# Patient Record
Sex: FEMALE | Race: WHITE | NOT HISPANIC OR LATINO | Employment: FULL TIME | URBAN - METROPOLITAN AREA
[De-identification: names, ages, dates, MRNs, and addresses within clinical notes are randomized per-mention and may not be internally consistent; named-entity substitution may affect disease eponyms.]

---

## 2022-05-19 ENCOUNTER — OFFICE VISIT (OUTPATIENT)
Dept: URGENT CARE | Facility: URGENT CARE | Age: 37
End: 2022-05-19
Payer: COMMERCIAL

## 2022-05-19 VITALS
RESPIRATION RATE: 20 BRPM | TEMPERATURE: 98.3 F | SYSTOLIC BLOOD PRESSURE: 120 MMHG | DIASTOLIC BLOOD PRESSURE: 82 MMHG | OXYGEN SATURATION: 100 % | HEART RATE: 90 BPM

## 2022-05-19 DIAGNOSIS — R07.89 LEFT CHEST PRESSURE: ICD-10-CM

## 2022-05-19 DIAGNOSIS — I10 PRIMARY HYPERTENSION: Primary | ICD-10-CM

## 2022-05-19 DIAGNOSIS — R00.2 PALPITATIONS: ICD-10-CM

## 2022-05-19 PROCEDURE — 99204 OFFICE O/P NEW MOD 45 MIN: CPT | Performed by: FAMILY MEDICINE

## 2022-05-19 PROCEDURE — 93000 ELECTROCARDIOGRAM COMPLETE: CPT | Performed by: FAMILY MEDICINE

## 2022-05-19 RX ORDER — ALBUTEROL SULFATE 90 UG/1
1-2 AEROSOL, METERED RESPIRATORY (INHALATION)
COMMUNITY
Start: 2020-11-04

## 2022-05-19 RX ORDER — ONDANSETRON 4 MG/1
4 TABLET, ORALLY DISINTEGRATING ORAL
COMMUNITY
Start: 2021-09-10

## 2022-05-19 RX ORDER — METOPROLOL SUCCINATE 25 MG/1
12.5 TABLET, EXTENDED RELEASE ORAL
COMMUNITY
Start: 2022-04-25 | End: 2023-04-25

## 2022-05-19 RX ORDER — LEVALBUTEROL TARTRATE 45 UG/1
1-2 AEROSOL, METERED ORAL
COMMUNITY
Start: 2022-05-11

## 2022-05-19 NOTE — PROGRESS NOTES
ASSESS/ PLAN  HTN (hypertension)  Blood pressure taken today was  Normal - but patient has noticed elevated BP at home readings    Discussed common OTC medications , like decongestants and NSAIDs  that will raise the blood pressure.  Recommend that decongestants should be avoided . NSAIDs should be used sparingly only for severe pain,         Patient should continue hypertension medications at current dose and schedule.  She is taking a very low dose of metoprolol qday to control palpitations and for BP control.  Discussed that when the medication becomes low in the blood she will have reflex blood pressure elevation and she may feel jittery    Frequent home/ store blood pressure checks are encouraged at different times of day.  Patient should keep a diary of blood pressure levels and share that information with the primary care provider.    Left chest pressure     - EKG 12-lead complete w/read - Clinics  - XR Chest 2 Views; Future     The multiple causes of intermittent chest pain were considered, including myocardial infarction, arrhythmia,    Pneumonia, pneumothorax, pleurisy,   ,  GERD,  Referred pain from the stomach, pancreas, gallbladder or liver, hiatal hernia, costochondritis, chest muscle strain    Exam and testing showed:  Physical exam localized the past pain to the left lateral chest region .  Has now no chest wall pain,   no epigastric/  Upper abdominal pain,  no CVA pain.  no pain of the costochondral region  Heart sounds- regular rate, no significant tachycardia or bradycardia  No signs of pneumonia, fluid overload, enlarged heart or pneumothorax on CXR.  No rib fracture noted  No lower extremity edema, calf pain, palpable cords    EKG:  No acute ST ischemic changes.  Normal sinus rhythm, rate 81  CXR shows no infiltrates, no pneumothorax , no cardiomegally  Blood count 2 days ago show ,   no anemia     Incorporating the results of history, physical exam, imaging and other testing, the most  likely cause of the patient's symptoms is a chest wall pain of connective tissue causing her intermittent brief episodes of stab/ poking sensation     Patient was reassured that no signs of acute cardiopulmonary pathology were identified, but also that the  does not have the lab or imaging resources that are available at the ER  If symptoms are recurring or worsening, or increased shortness of breath, patient should present to the ER for further evaluation         Palpitations   The EKG at present shows no arrhythmias and no tachycardia, but patient reports episodes of palpitations.  Discussed with the patient that she would likely need home ambulatory EKG monitoring to evaluate the rhythm when she has these episodes of palpitations.   She may arrange with her primary care or with cardiology.  If she will follow-up with cardiology, I recommend she seek care in the same health system as her primary care            --------------------------------------------------------------------------------------------------------------------------    SUBJECTIVE:    Chief Complaint   Patient presents with     Urgent Care     Hypertension     Patient started BP meds a month ago-Having increased readings and heart flutters        Christel Holman is a 36 year old female who presents to the office with the CC of elevated home BP readings, left stab/ poking chest pain and intermittent palpitations  Patient complains of chest pain, chest pressure/discomfort and palpitations.  The pain is characterized as moderate, localized and brief sharp and stabbing/ poking located left lateral chest with radiation to none. Symptoms began 1 week(s) ago, gradual onset and worsening,  More often,  Now she gets these poke/ stab feelings about 20 x per day lasting about 1 second  Pain is associated with palpitations sometimes  Pain is exacerbated by movement.  Pain is relieved by nothing.  Cardiac risk factors: family history, hypertension    PMH:    Anxiety, asthma    ALLERGIES:  Bupropion, Erythromycin, Topiramate, Diphenhydramine, and Escitalopram    MEDs  albuterol (PROAIR HFA/PROVENTIL HFA/VENTOLIN HFA) 108 (90 Base) MCG/ACT inhaler, Inhale 1-2 puffs into the lungs  aluminum chloride (DRYSOL) 20 % external solution, Apply 1 Application topically  levalbuterol (XOPENEX HFA) 45 MCG/ACT inhaler, Inhale 1-2 puffs into the lungs  metoprolol succinate ER (TOPROL XL) 25 MG 24 hr tablet, Take 12.5 mg by mouth  ondansetron (ZOFRAN ODT) 4 MG ODT tab, Take 4 mg by mouth    No current facility-administered medications on file prior to visit.      Social History     Tobacco Use     Smoking status: Former Smoker     Smokeless tobacco: Never Used   Substance Use Topics     Alcohol use: Not on file      Review Of Systems    Constitutional:  Negative for fevers, chills   Skin: negative for rash or lesions  Eyes: negative for eye pain, visual changes  Ears/Nose/Throat: negative for earache  , sinus trouble, sore throat  Respiratory: No shortness of breath, dyspnea on exertion  or hemoptysis  Cardiovascular:  palpitations, tachycardia and chest pain  Gastrointestinal: negative for vomiting, abdominal pain and diarrhea  Genitourinary: negative for dysuria and hematuria  Back: negative for pain with back movement,  CVA pain  Musculoskeletal: negative for generalized joint pain, joint swelling and joint stiffness       EXAM:  /82   Pulse 90   Temp 98.3  F (36.8  C) (Tympanic)   Resp 20   LMP 05/06/2022   SpO2 100%   Breastfeeding No   GENERAL APPEARANCE: alert, moderate distress and cooperative  EYES: EOMI,  PERRL, conjunctiva clear  HENT: ear canals and TM's normal.  Nose and mouth without ulcers, erythema or lesions  NECK: supple, nontender, no lymphadenopathy  RESP: lungs clear to auscultation - no rales, rhonchi or wheezes  CV: regular rates and rhythm, normal S1 S2, no murmur noted-  No pain with deep inspiration,  Occasional pain left lateral chest with  twisting the trunk, No pain with palpation of the ribs or the costochondral joints  ABDOMEN:  soft, nontender, no HSM or masses and bowel sounds normal  NEURO: Normal strength and tone, sensory exam grossly normal,  normal speech and mentation  SKIN: no suspicious lesions or rashes     Labs:  Reviewed recent labs at primary care-  Cbc normal 2 days ago  Normal metabolic panel, HIV, Lipids, TSH, CRP, Hgb A1c , ALT three weeks ago  She had low Vitamin D    CXR- No cardiomegaly, no pneumothorax, no plural effusion,  no pulmonary edema,  No noted rib / clavicle fracture.     no infiltrate   x-ray read by me Evette Valentine MD       Office EKG demonstrates:  appears normal, rate 81 NSR,normal axis, normal intervals, no acute ST/T changes c/w ischemia,no LVH by voltage criteria,     Results for orders placed or performed in visit on 05/19/22   XR Chest 2 Views     Status: None    Narrative    CHEST TWO VIEWS 5/19/2022 4:58 PM     HISTORY: Left chest pressure.    COMPARISON: None.    FINDINGS: Heart size and pulmonary vascularity are within normal  limits. The lungs are clear. No pneumothorax or pleural effusion.       Impression    IMPRESSION: Normal two views of the chest.     CHRISTELLE HELMS MD         SYSTEM ID:  [id number]

## 2022-05-29 ENCOUNTER — HEALTH MAINTENANCE LETTER (OUTPATIENT)
Age: 37
End: 2022-05-29

## 2022-10-03 ENCOUNTER — HEALTH MAINTENANCE LETTER (OUTPATIENT)
Age: 37
End: 2022-10-03

## 2023-06-04 ENCOUNTER — HEALTH MAINTENANCE LETTER (OUTPATIENT)
Age: 38
End: 2023-06-04

## 2024-07-28 ENCOUNTER — HEALTH MAINTENANCE LETTER (OUTPATIENT)
Age: 39
End: 2024-07-28